# Patient Record
Sex: FEMALE | Race: WHITE | NOT HISPANIC OR LATINO | URBAN - METROPOLITAN AREA
[De-identification: names, ages, dates, MRNs, and addresses within clinical notes are randomized per-mention and may not be internally consistent; named-entity substitution may affect disease eponyms.]

---

## 2017-06-08 ENCOUNTER — OUTPATIENT (OUTPATIENT)
Dept: OUTPATIENT SERVICES | Facility: HOSPITAL | Age: 32
LOS: 1 days | End: 2017-06-08
Payer: COMMERCIAL

## 2017-06-08 DIAGNOSIS — O26.899 OTHER SPECIFIED PREGNANCY RELATED CONDITIONS, UNSPECIFIED TRIMESTER: ICD-10-CM

## 2017-06-08 DIAGNOSIS — Z3A.00 WEEKS OF GESTATION OF PREGNANCY NOT SPECIFIED: ICD-10-CM

## 2017-06-09 DIAGNOSIS — O09.613 SUPERVISION OF YOUNG PRIMIGRAVIDA, THIRD TRIMESTER: ICD-10-CM

## 2017-06-09 PROCEDURE — 99214 OFFICE O/P EST MOD 30 MIN: CPT

## 2017-06-09 PROCEDURE — 76818 FETAL BIOPHYS PROFILE W/NST: CPT

## 2017-06-10 ENCOUNTER — OUTPATIENT (OUTPATIENT)
Dept: OUTPATIENT SERVICES | Facility: HOSPITAL | Age: 32
LOS: 1 days | End: 2017-06-10
Payer: COMMERCIAL

## 2017-06-10 DIAGNOSIS — O26.899 OTHER SPECIFIED PREGNANCY RELATED CONDITIONS, UNSPECIFIED TRIMESTER: ICD-10-CM

## 2017-06-10 DIAGNOSIS — Z3A.00 WEEKS OF GESTATION OF PREGNANCY NOT SPECIFIED: ICD-10-CM

## 2017-06-10 PROCEDURE — 76818 FETAL BIOPHYS PROFILE W/NST: CPT

## 2017-06-10 PROCEDURE — 99214 OFFICE O/P EST MOD 30 MIN: CPT

## 2017-06-11 ENCOUNTER — OUTPATIENT (OUTPATIENT)
Dept: OUTPATIENT SERVICES | Facility: HOSPITAL | Age: 32
LOS: 1 days | End: 2017-06-11
Payer: COMMERCIAL

## 2017-06-11 DIAGNOSIS — O26.899 OTHER SPECIFIED PREGNANCY RELATED CONDITIONS, UNSPECIFIED TRIMESTER: ICD-10-CM

## 2017-06-11 DIAGNOSIS — Z3A.00 WEEKS OF GESTATION OF PREGNANCY NOT SPECIFIED: ICD-10-CM

## 2017-06-11 PROCEDURE — 99214 OFFICE O/P EST MOD 30 MIN: CPT

## 2017-06-15 ENCOUNTER — INPATIENT (INPATIENT)
Facility: HOSPITAL | Age: 32
LOS: 3 days | Discharge: HOME | End: 2017-06-19
Attending: OBSTETRICS & GYNECOLOGY | Admitting: OBSTETRICS & GYNECOLOGY
Payer: COMMERCIAL

## 2017-06-15 VITALS — HEIGHT: 64 IN | WEIGHT: 160.06 LBS

## 2017-06-15 LAB
ALBUMIN SERPL ELPH-MCNC: 3.4 G/DL — SIGNIFICANT CHANGE UP (ref 3.3–5)
ALP SERPL-CCNC: 59 U/L — SIGNIFICANT CHANGE UP (ref 40–120)
ALT FLD-CCNC: 12 U/L — SIGNIFICANT CHANGE UP (ref 10–45)
ANION GAP SERPL CALC-SCNC: 11 MMOL/L — SIGNIFICANT CHANGE UP (ref 5–17)
APPEARANCE UR: CLEAR — SIGNIFICANT CHANGE UP
APTT BLD: 29.1 SEC — SIGNIFICANT CHANGE UP (ref 27.5–37.4)
AST SERPL-CCNC: 12 U/L — SIGNIFICANT CHANGE UP (ref 10–40)
BASOPHILS NFR BLD AUTO: 0.2 % — SIGNIFICANT CHANGE UP (ref 0–2)
BILIRUB SERPL-MCNC: 0.2 MG/DL — SIGNIFICANT CHANGE UP (ref 0.2–1.2)
BILIRUB UR-MCNC: NEGATIVE — SIGNIFICANT CHANGE UP
BLD GP AB SCN SERPL QL: NEGATIVE — SIGNIFICANT CHANGE UP
BUN SERPL-MCNC: 11 MG/DL — SIGNIFICANT CHANGE UP (ref 7–23)
CALCIUM SERPL-MCNC: 9.1 MG/DL — SIGNIFICANT CHANGE UP (ref 8.4–10.5)
CHLORIDE SERPL-SCNC: 102 MMOL/L — SIGNIFICANT CHANGE UP (ref 96–108)
CO2 SERPL-SCNC: 24 MMOL/L — SIGNIFICANT CHANGE UP (ref 22–31)
COLOR SPEC: YELLOW — SIGNIFICANT CHANGE UP
CREAT SERPL-MCNC: 0.5 MG/DL — SIGNIFICANT CHANGE UP (ref 0.5–1.3)
DIFF PNL FLD: NEGATIVE — SIGNIFICANT CHANGE UP
EOSINOPHIL NFR BLD AUTO: 0.9 % — SIGNIFICANT CHANGE UP (ref 0–6)
FIBRINOGEN PPP-MCNC: 311 MG/DL — SIGNIFICANT CHANGE UP (ref 258–438)
GLUCOSE SERPL-MCNC: 85 MG/DL — SIGNIFICANT CHANGE UP (ref 70–99)
GLUCOSE UR QL: NEGATIVE — SIGNIFICANT CHANGE UP
HCT VFR BLD CALC: 34.1 % — LOW (ref 34.5–45)
HGB BLD-MCNC: 11.5 G/DL — SIGNIFICANT CHANGE UP (ref 11.5–15.5)
INR BLD: 1.18 — HIGH (ref 0.88–1.16)
KETONES UR-MCNC: NEGATIVE — SIGNIFICANT CHANGE UP
LDH SERPL L TO P-CCNC: 113 U/L — SIGNIFICANT CHANGE UP (ref 50–242)
LEUKOCYTE ESTERASE UR-ACNC: (no result)
LYMPHOCYTES # BLD AUTO: 26 % — SIGNIFICANT CHANGE UP (ref 13–44)
MCHC RBC-ENTMCNC: 27.4 PG — SIGNIFICANT CHANGE UP (ref 27–34)
MCHC RBC-ENTMCNC: 33.7 G/DL — SIGNIFICANT CHANGE UP (ref 32–36)
MCV RBC AUTO: 81.4 FL — SIGNIFICANT CHANGE UP (ref 80–100)
MONOCYTES NFR BLD AUTO: 8.2 % — SIGNIFICANT CHANGE UP (ref 2–14)
NEUTROPHILS NFR BLD AUTO: 64.7 % — SIGNIFICANT CHANGE UP (ref 43–77)
NITRITE UR-MCNC: NEGATIVE — SIGNIFICANT CHANGE UP
PH UR: 6 — SIGNIFICANT CHANGE UP (ref 5–8)
PLATELET # BLD AUTO: 199 K/UL — SIGNIFICANT CHANGE UP (ref 150–400)
POTASSIUM SERPL-MCNC: 4.5 MMOL/L — SIGNIFICANT CHANGE UP (ref 3.5–5.3)
POTASSIUM SERPL-SCNC: 4.5 MMOL/L — SIGNIFICANT CHANGE UP (ref 3.5–5.3)
PROT SERPL-MCNC: 5.8 G/DL — LOW (ref 6–8.3)
PROT UR-MCNC: (no result) MG/DL
PROTHROM AB SERPL-ACNC: 13.1 SEC — HIGH (ref 9.8–12.7)
RBC # BLD: 4.19 M/UL — SIGNIFICANT CHANGE UP (ref 3.8–5.2)
RBC # FLD: 13 % — SIGNIFICANT CHANGE UP (ref 10.3–16.9)
RH IG SCN BLD-IMP: POSITIVE — SIGNIFICANT CHANGE UP
SODIUM SERPL-SCNC: 137 MMOL/L — SIGNIFICANT CHANGE UP (ref 135–145)
SP GR SPEC: 1.02 — SIGNIFICANT CHANGE UP (ref 1–1.03)
URATE SERPL-MCNC: 3.7 MG/DL — SIGNIFICANT CHANGE UP (ref 2.5–7)
UROBILINOGEN FLD QL: 0.2 E.U./DL — SIGNIFICANT CHANGE UP
WBC # BLD: 5.6 K/UL — SIGNIFICANT CHANGE UP (ref 3.8–10.5)
WBC # FLD AUTO: 5.6 K/UL — SIGNIFICANT CHANGE UP (ref 3.8–10.5)

## 2017-06-15 RX ORDER — OXYTOCIN 10 UNIT/ML
333.33 VIAL (ML) INJECTION
Qty: 20 | Refills: 0 | Status: DISCONTINUED | OUTPATIENT
Start: 2017-06-15 | End: 2017-06-16

## 2017-06-15 RX ORDER — CITRIC ACID/SODIUM CITRATE 300-500 MG
15 SOLUTION, ORAL ORAL EVERY 4 HOURS
Qty: 0 | Refills: 0 | Status: DISCONTINUED | OUTPATIENT
Start: 2017-06-15 | End: 2017-06-16

## 2017-06-15 RX ORDER — SODIUM CHLORIDE 9 MG/ML
1000 INJECTION, SOLUTION INTRAVENOUS
Qty: 0 | Refills: 0 | Status: DISCONTINUED | OUTPATIENT
Start: 2017-06-15 | End: 2017-06-16

## 2017-06-15 RX ORDER — SODIUM CHLORIDE 9 MG/ML
1000 INJECTION, SOLUTION INTRAVENOUS ONCE
Qty: 0 | Refills: 0 | Status: COMPLETED | OUTPATIENT
Start: 2017-06-15 | End: 2017-06-16

## 2017-06-15 RX ORDER — DINOPROSTONE 10 MG/241MG
10 INSERT VAGINAL ONCE
Qty: 0 | Refills: 0 | Status: COMPLETED | OUTPATIENT
Start: 2017-06-15 | End: 2017-06-15

## 2017-06-15 RX ADMIN — DINOPROSTONE 10 MILLIGRAM(S): 10 INSERT VAGINAL at 11:19

## 2017-06-16 DIAGNOSIS — O09.613 SUPERVISION OF YOUNG PRIMIGRAVIDA, THIRD TRIMESTER: ICD-10-CM

## 2017-06-16 DIAGNOSIS — O09.623 SUPERVISION OF YOUNG MULTIGRAVIDA, THIRD TRIMESTER: ICD-10-CM

## 2017-06-16 LAB
ALBUMIN SERPL ELPH-MCNC: 2.5 G/DL — LOW (ref 3.3–5)
ALP SERPL-CCNC: 48 U/L — SIGNIFICANT CHANGE UP (ref 40–120)
ALT FLD-CCNC: 11 U/L — SIGNIFICANT CHANGE UP (ref 10–45)
ANION GAP SERPL CALC-SCNC: 12 MMOL/L — SIGNIFICANT CHANGE UP (ref 5–17)
APTT BLD: 30.5 SEC — SIGNIFICANT CHANGE UP (ref 27.5–37.4)
APTT BLD: 34.2 SEC — SIGNIFICANT CHANGE UP (ref 27.5–37.4)
AST SERPL-CCNC: 14 U/L — SIGNIFICANT CHANGE UP (ref 10–40)
BASE EXCESS BLDA CALC-SCNC: -5.6 MMOL/L — LOW (ref -2–3)
BILIRUB SERPL-MCNC: 0.4 MG/DL — SIGNIFICANT CHANGE UP (ref 0.2–1.2)
BUN SERPL-MCNC: 6 MG/DL — LOW (ref 7–23)
CA-I BLDA-SCNC: 1.01 MMOL/L — LOW (ref 1.12–1.3)
CALCIUM SERPL-MCNC: 7 MG/DL — LOW (ref 8.4–10.5)
CHLORIDE SERPL-SCNC: 107 MMOL/L — SIGNIFICANT CHANGE UP (ref 96–108)
CO2 SERPL-SCNC: 18 MMOL/L — LOW (ref 22–31)
COHGB MFR BLDA: 0.3 % — SIGNIFICANT CHANGE UP
CREAT SERPL-MCNC: 0.5 MG/DL — SIGNIFICANT CHANGE UP (ref 0.5–1.3)
D DIMER BLD IA.RAPID-MCNC: 694 NG/ML DDU — HIGH
EOSINOPHIL NFR BLD AUTO: 0.2 % — SIGNIFICANT CHANGE UP (ref 0–6)
EOSINOPHIL NFR BLD AUTO: 0.2 % — SIGNIFICANT CHANGE UP (ref 0–6)
FIBRINOGEN PPP-MCNC: 257 MG/DL — LOW (ref 258–438)
GAS PNL BLDA: SIGNIFICANT CHANGE UP
GLUCOSE SERPL-MCNC: 180 MG/DL — HIGH (ref 70–99)
HCO3 BLDA-SCNC: 19 MMOL/L — LOW (ref 21–28)
HCT VFR BLD CALC: 28.8 % — LOW (ref 34.5–45)
HCT VFR BLD CALC: 28.9 % — LOW (ref 34.5–45)
HGB BLD-MCNC: 9.4 G/DL — LOW (ref 11.5–15.5)
HGB BLD-MCNC: 9.4 G/DL — LOW (ref 11.5–15.5)
HGB BLDA-MCNC: 8.5 G/DL — LOW (ref 11.5–15.5)
INR BLD: 1.18 — HIGH (ref 0.88–1.16)
INR BLD: 1.26 — HIGH (ref 0.88–1.16)
LYMPHOCYTES # BLD AUTO: 16.2 % — SIGNIFICANT CHANGE UP (ref 13–44)
LYMPHOCYTES # BLD AUTO: 8.9 % — LOW (ref 13–44)
MCHC RBC-ENTMCNC: 27.1 PG — SIGNIFICANT CHANGE UP (ref 27–34)
MCHC RBC-ENTMCNC: 27.4 PG — SIGNIFICANT CHANGE UP (ref 27–34)
MCHC RBC-ENTMCNC: 32.5 G/DL — SIGNIFICANT CHANGE UP (ref 32–36)
MCHC RBC-ENTMCNC: 32.6 G/DL — SIGNIFICANT CHANGE UP (ref 32–36)
MCV RBC AUTO: 83.3 FL — SIGNIFICANT CHANGE UP (ref 80–100)
MCV RBC AUTO: 84 FL — SIGNIFICANT CHANGE UP (ref 80–100)
METHGB MFR BLDA: 0.2 % — SIGNIFICANT CHANGE UP
MONOCYTES NFR BLD AUTO: 4.3 % — SIGNIFICANT CHANGE UP (ref 2–14)
MONOCYTES NFR BLD AUTO: 4.8 % — SIGNIFICANT CHANGE UP (ref 2–14)
NEUTROPHILS NFR BLD AUTO: 78.8 % — HIGH (ref 43–77)
NEUTROPHILS NFR BLD AUTO: 86.6 % — HIGH (ref 43–77)
O2 CT VFR BLDA CALC: SIGNIFICANT CHANGE UP (ref 15–23)
OXYHGB MFR BLDA: 99 % — SIGNIFICANT CHANGE UP (ref 94–100)
PCO2 BLDA: 36 MMHG — SIGNIFICANT CHANGE UP (ref 32–45)
PH BLDA: 7.35 — SIGNIFICANT CHANGE UP (ref 7.35–7.45)
PLATELET # BLD AUTO: 160 K/UL — SIGNIFICANT CHANGE UP (ref 150–400)
PLATELET # BLD AUTO: 201 K/UL — SIGNIFICANT CHANGE UP (ref 150–400)
PO2 BLDA: 213 MMHG — HIGH (ref 83–108)
POTASSIUM BLDA-SCNC: 3.4 MMOL/L — LOW (ref 3.5–4.9)
POTASSIUM SERPL-MCNC: 3.8 MMOL/L — SIGNIFICANT CHANGE UP (ref 3.5–5.3)
POTASSIUM SERPL-SCNC: 3.8 MMOL/L — SIGNIFICANT CHANGE UP (ref 3.5–5.3)
PROT SERPL-MCNC: 4.2 G/DL — LOW (ref 6–8.3)
PROTHROM AB SERPL-ACNC: 13.1 SEC — HIGH (ref 9.8–12.7)
PROTHROM AB SERPL-ACNC: 14.1 SEC — HIGH (ref 9.8–12.7)
RBC # BLD: 3.43 M/UL — LOW (ref 3.8–5.2)
RBC # BLD: 3.47 M/UL — LOW (ref 3.8–5.2)
RBC # FLD: 12.7 % — SIGNIFICANT CHANGE UP (ref 10.3–16.9)
RBC # FLD: 12.8 % — SIGNIFICANT CHANGE UP (ref 10.3–16.9)
SAO2 % BLDA: 99 % — SIGNIFICANT CHANGE UP (ref 95–100)
SODIUM BLDA-SCNC: 128 MMOL/L — LOW (ref 138–146)
SODIUM SERPL-SCNC: 137 MMOL/L — SIGNIFICANT CHANGE UP (ref 135–145)
T PALLIDUM AB TITR SER: NEGATIVE — SIGNIFICANT CHANGE UP
WBC # BLD: 12 K/UL — HIGH (ref 3.8–10.5)
WBC # BLD: 9.4 K/UL — SIGNIFICANT CHANGE UP (ref 3.8–10.5)
WBC # FLD AUTO: 12 K/UL — HIGH (ref 3.8–10.5)
WBC # FLD AUTO: 9.4 K/UL — SIGNIFICANT CHANGE UP (ref 3.8–10.5)

## 2017-06-16 RX ORDER — FERROUS SULFATE 325(65) MG
325 TABLET ORAL DAILY
Qty: 0 | Refills: 0 | Status: DISCONTINUED | OUTPATIENT
Start: 2017-06-17 | End: 2017-06-19

## 2017-06-16 RX ORDER — DOCUSATE SODIUM 100 MG
100 CAPSULE ORAL
Qty: 0 | Refills: 0 | Status: DISCONTINUED | OUTPATIENT
Start: 2017-06-17 | End: 2017-06-19

## 2017-06-16 RX ORDER — OXYTOCIN 10 UNIT/ML
41.67 VIAL (ML) INJECTION
Qty: 20 | Refills: 0 | Status: DISCONTINUED | OUTPATIENT
Start: 2017-06-17 | End: 2017-06-19

## 2017-06-16 RX ORDER — NALOXONE HYDROCHLORIDE 4 MG/.1ML
0.1 SPRAY NASAL
Qty: 0 | Refills: 0 | Status: DISCONTINUED | OUTPATIENT
Start: 2017-06-16 | End: 2017-06-19

## 2017-06-16 RX ORDER — OXYCODONE HYDROCHLORIDE 5 MG/1
5 TABLET ORAL
Qty: 0 | Refills: 0 | Status: DISCONTINUED | OUTPATIENT
Start: 2017-06-16 | End: 2017-06-19

## 2017-06-16 RX ORDER — DIPHENHYDRAMINE HCL 50 MG
25 CAPSULE ORAL EVERY 6 HOURS
Qty: 0 | Refills: 0 | Status: DISCONTINUED | OUTPATIENT
Start: 2017-06-17 | End: 2017-06-19

## 2017-06-16 RX ORDER — OXYCODONE HYDROCHLORIDE 5 MG/1
10 TABLET ORAL
Qty: 0 | Refills: 0 | Status: DISCONTINUED | OUTPATIENT
Start: 2017-06-16 | End: 2017-06-19

## 2017-06-16 RX ORDER — IBUPROFEN 200 MG
600 TABLET ORAL EVERY 6 HOURS
Qty: 0 | Refills: 0 | Status: DISCONTINUED | OUTPATIENT
Start: 2017-06-17 | End: 2017-06-19

## 2017-06-16 RX ORDER — GLYCERIN ADULT
1 SUPPOSITORY, RECTAL RECTAL AT BEDTIME
Qty: 0 | Refills: 0 | Status: DISCONTINUED | OUTPATIENT
Start: 2017-06-17 | End: 2017-06-19

## 2017-06-16 RX ORDER — ONDANSETRON 8 MG/1
4 TABLET, FILM COATED ORAL EVERY 6 HOURS
Qty: 0 | Refills: 0 | Status: DISCONTINUED | OUTPATIENT
Start: 2017-06-16 | End: 2017-06-19

## 2017-06-16 RX ORDER — SIMETHICONE 80 MG/1
80 TABLET, CHEWABLE ORAL EVERY 4 HOURS
Qty: 0 | Refills: 0 | Status: DISCONTINUED | OUTPATIENT
Start: 2017-06-17 | End: 2017-06-19

## 2017-06-16 RX ORDER — ACETAMINOPHEN 500 MG
650 TABLET ORAL EVERY 6 HOURS
Qty: 0 | Refills: 0 | Status: DISCONTINUED | OUTPATIENT
Start: 2017-06-17 | End: 2017-06-19

## 2017-06-16 RX ORDER — OXYTOCIN 10 UNIT/ML
41.67 VIAL (ML) INJECTION
Qty: 20 | Refills: 0 | Status: DISCONTINUED | OUTPATIENT
Start: 2017-06-16 | End: 2017-06-19

## 2017-06-16 RX ORDER — HYDROMORPHONE HYDROCHLORIDE 2 MG/ML
0.5 INJECTION INTRAMUSCULAR; INTRAVENOUS; SUBCUTANEOUS
Qty: 0 | Refills: 0 | Status: DISCONTINUED | OUTPATIENT
Start: 2017-06-16 | End: 2017-06-19

## 2017-06-16 RX ORDER — OXYTOCIN 10 UNIT/ML
333.33 VIAL (ML) INJECTION
Qty: 20 | Refills: 0 | Status: DISCONTINUED | OUTPATIENT
Start: 2017-06-16 | End: 2017-06-19

## 2017-06-16 RX ORDER — SODIUM CHLORIDE 9 MG/ML
1000 INJECTION, SOLUTION INTRAVENOUS
Qty: 0 | Refills: 0 | Status: DISCONTINUED | OUTPATIENT
Start: 2017-06-17 | End: 2017-06-19

## 2017-06-16 RX ORDER — SODIUM CHLORIDE 9 MG/ML
1000 INJECTION, SOLUTION INTRAVENOUS
Qty: 0 | Refills: 0 | Status: DISCONTINUED | OUTPATIENT
Start: 2017-06-16 | End: 2017-06-17

## 2017-06-16 RX ORDER — ACETAMINOPHEN 500 MG
1000 TABLET ORAL ONCE
Qty: 0 | Refills: 0 | Status: COMPLETED | OUTPATIENT
Start: 2017-06-16 | End: 2017-06-16

## 2017-06-16 RX ORDER — LANOLIN
1 OINTMENT (GRAM) TOPICAL
Qty: 0 | Refills: 0 | Status: DISCONTINUED | OUTPATIENT
Start: 2017-06-17 | End: 2017-06-19

## 2017-06-16 RX ORDER — TETANUS TOXOID, REDUCED DIPHTHERIA TOXOID AND ACELLULAR PERTUSSIS VACCINE, ADSORBED 5; 2.5; 8; 8; 2.5 [IU]/.5ML; [IU]/.5ML; UG/.5ML; UG/.5ML; UG/.5ML
0.5 SUSPENSION INTRAMUSCULAR ONCE
Qty: 0 | Refills: 0 | Status: DISCONTINUED | OUTPATIENT
Start: 2017-06-17 | End: 2017-06-19

## 2017-06-16 RX ORDER — GENTAMICIN SULFATE 40 MG/ML
250 VIAL (ML) INJECTION ONCE
Qty: 0 | Refills: 0 | Status: DISCONTINUED | OUTPATIENT
Start: 2017-06-16 | End: 2017-06-16

## 2017-06-16 RX ORDER — OXYTOCIN 10 UNIT/ML
1 VIAL (ML) INJECTION
Qty: 30 | Refills: 0 | Status: DISCONTINUED | OUTPATIENT
Start: 2017-06-16 | End: 2017-06-16

## 2017-06-16 RX ADMIN — Medication 1 MILLIUNIT(S)/MIN: at 03:44

## 2017-06-16 RX ADMIN — SODIUM CHLORIDE 125 MILLILITER(S): 9 INJECTION, SOLUTION INTRAVENOUS at 03:44

## 2017-06-16 RX ADMIN — SODIUM CHLORIDE 2000 MILLILITER(S): 9 INJECTION, SOLUTION INTRAVENOUS at 07:35

## 2017-06-16 RX ADMIN — Medication 400 MILLIGRAM(S): at 23:32

## 2017-06-17 LAB
HCT VFR BLD CALC: 28.5 % — LOW (ref 34.5–45)
HCT VFR BLD CALC: 32.3 % — LOW (ref 34.5–45)
HGB BLD-MCNC: 10.9 G/DL — LOW (ref 11.5–15.5)
HGB BLD-MCNC: 9.4 G/DL — LOW (ref 11.5–15.5)
LYMPHOCYTES # BLD AUTO: 4.9 % — LOW (ref 13–44)
MCHC RBC-ENTMCNC: 27.8 PG — SIGNIFICANT CHANGE UP (ref 27–34)
MCHC RBC-ENTMCNC: 28 PG — SIGNIFICANT CHANGE UP (ref 27–34)
MCHC RBC-ENTMCNC: 33 G/DL — SIGNIFICANT CHANGE UP (ref 32–36)
MCHC RBC-ENTMCNC: 33.7 G/DL — SIGNIFICANT CHANGE UP (ref 32–36)
MCV RBC AUTO: 83 FL — SIGNIFICANT CHANGE UP (ref 80–100)
MCV RBC AUTO: 84.3 FL — SIGNIFICANT CHANGE UP (ref 80–100)
MONOCYTES NFR BLD AUTO: 5.3 % — SIGNIFICANT CHANGE UP (ref 2–14)
NEUTROPHILS NFR BLD AUTO: 89.8 % — HIGH (ref 43–77)
PLATELET # BLD AUTO: 175 K/UL — SIGNIFICANT CHANGE UP (ref 150–400)
PLATELET # BLD AUTO: 188 K/UL — SIGNIFICANT CHANGE UP (ref 150–400)
RBC # BLD: 3.38 M/UL — LOW (ref 3.8–5.2)
RBC # BLD: 3.89 M/UL — SIGNIFICANT CHANGE UP (ref 3.8–5.2)
RBC # FLD: 12.8 % — SIGNIFICANT CHANGE UP (ref 10.3–16.9)
RBC # FLD: 13.2 % — SIGNIFICANT CHANGE UP (ref 10.3–16.9)
WBC # BLD: 13.2 K/UL — HIGH (ref 3.8–10.5)
WBC # BLD: 9.3 K/UL — SIGNIFICANT CHANGE UP (ref 3.8–10.5)
WBC # FLD AUTO: 13.2 K/UL — HIGH (ref 3.8–10.5)
WBC # FLD AUTO: 9.3 K/UL — SIGNIFICANT CHANGE UP (ref 3.8–10.5)

## 2017-06-17 RX ORDER — LORATADINE 10 MG/1
10 TABLET ORAL DAILY
Qty: 0 | Refills: 0 | Status: DISCONTINUED | OUTPATIENT
Start: 2017-06-17 | End: 2017-06-19

## 2017-06-17 RX ADMIN — Medication 1 TABLET(S): at 11:30

## 2017-06-17 RX ADMIN — OXYCODONE HYDROCHLORIDE 5 MILLIGRAM(S): 5 TABLET ORAL at 23:28

## 2017-06-17 RX ADMIN — Medication 600 MILLIGRAM(S): at 07:20

## 2017-06-17 RX ADMIN — Medication 325 MILLIGRAM(S): at 11:30

## 2017-06-17 RX ADMIN — Medication 600 MILLIGRAM(S): at 12:20

## 2017-06-17 RX ADMIN — Medication 100 MILLIGRAM(S): at 11:31

## 2017-06-17 RX ADMIN — Medication 1 APPLICATION(S): at 11:36

## 2017-06-17 RX ADMIN — Medication 600 MILLIGRAM(S): at 06:48

## 2017-06-17 RX ADMIN — Medication 600 MILLIGRAM(S): at 18:27

## 2017-06-17 RX ADMIN — Medication 600 MILLIGRAM(S): at 11:31

## 2017-06-17 RX ADMIN — Medication 600 MILLIGRAM(S): at 19:01

## 2017-06-17 NOTE — PROGRESS NOTE ADULT - SUBJECTIVE AND OBJECTIVE BOX
Patient is doing better mentally. Bonded with baby. Producing some colostrum.   Clinical status is also stable. No heavy bleeding. VSS. Pain is controlled. Ate some food. Catheter was removed.     Baby is in stable condition. Neurologist will see the baby tonight.

## 2017-06-17 NOTE — PROGRESS NOTE ADULT - SUBJECTIVE AND OBJECTIVE BOX
Patient evaluated at bedside. She has figueroa catheter in and has been resting in bed overnight since procedure.  She reports pain is well controlled.  She denies headache, dizziness, chest pain, palpitations, shortness of breathe, nausea, vomiting or heavy vaginal bleeding.    Physical Exam:  Vital Signs Last 24 Hrs  T(C): 36.4, Max: 36.4 (06-17 @ 02:00)  T(F): 97.6, Max: 97.6 (06-17 @ 02:00)  HR: 71 (71 - 99)  BP: 100/67 (100/67 - 145/89)  BP(mean): --  RR: 18 (16 - 18)  SpO2: 97% (9% - 100%)    Constitutional: Alert & Oriented x3, No acute distress, cooperative   Gastrointestinal: soft, mildy tender, positive bowel sounds, no rebound or guarding; uterus firm at midline, three fb below umbilicus  Incision: steristrips in place; area clean, dry and intact; no erythema or induration   : s/p figueroa, lochia WNL   Extremities: SCDs in place, no calf tenderness or swelling                          10.9   13.2  )-----------( 188      ( 17 Jun 2017 00:31 )             32.3     06-16    137  |  107  |  6<L>  ----------------------------<  180<H>  3.8   |  18<L>  |  0.50    Ca    7.0<L>      16 Jun 2017 20:01    TPro  4.2<L>  /  Alb  2.5<L>  /  TBili  0.4  /  DBili  x   /  AST  14  /  ALT  11  /  AlkPhos  48  06-16    PT/INR - ( 16 Jun 2017 20:01 )   PT: 14.1 sec;   INR: 1.26       PTT - ( 16 Jun 2017 20:01 )  PTT:30.5 sec    A/P: Pt doing very well POD1 s/p CS complicated by acute blood loss during procedure requiring two units pRBC.  Vitals and repeat H&H remain stable and pt is clinically stable.  - Clears till pm 6/17  - Bandage till pm 6/17  - d/c figueroa  - Encourage ambulation  - Encourage incentive spirometry  - SCDs + SQH    Welebir PGY3

## 2017-06-17 NOTE — PROGRESS NOTE ADULT - SUBJECTIVE AND OBJECTIVE BOX
Patient is doing physically/clinically well. VSS are stable. Pain is controlled. Urine output is good. Had Esure for breakfast. No nausea. Abdomen is mildly distended, expected. dressing is clean and intact. No heavy bleeding. Helped her to go to NICU to see the baby. Patient is emotionally in some distress, crying, due to uncertainty of baby's diagnosis. I discussed with her what I know about the baby's condition: contracted joints, unclear diagnosis and prognosis. We do not have answers right. Tried to console the patient and encouraged to bond with baby. She has a very strong support from her  and family.

## 2017-06-18 RX ADMIN — Medication 600 MILLIGRAM(S): at 01:06

## 2017-06-18 RX ADMIN — Medication 650 MILLIGRAM(S): at 21:29

## 2017-06-18 RX ADMIN — Medication 300 MILLIGRAM(S): at 23:59

## 2017-06-18 RX ADMIN — Medication 25 MILLIGRAM(S): at 23:56

## 2017-06-18 RX ADMIN — OXYCODONE HYDROCHLORIDE 5 MILLIGRAM(S): 5 TABLET ORAL at 00:15

## 2017-06-18 RX ADMIN — Medication 600 MILLIGRAM(S): at 17:40

## 2017-06-18 RX ADMIN — Medication 325 MILLIGRAM(S): at 11:56

## 2017-06-18 RX ADMIN — Medication 100 MILLIGRAM(S): at 17:40

## 2017-06-18 RX ADMIN — Medication 600 MILLIGRAM(S): at 23:56

## 2017-06-18 RX ADMIN — Medication 600 MILLIGRAM(S): at 12:30

## 2017-06-18 RX ADMIN — Medication 600 MILLIGRAM(S): at 18:30

## 2017-06-18 RX ADMIN — Medication 600 MILLIGRAM(S): at 06:42

## 2017-06-18 RX ADMIN — Medication 1 TABLET(S): at 11:56

## 2017-06-18 RX ADMIN — Medication 600 MILLIGRAM(S): at 02:06

## 2017-06-18 RX ADMIN — Medication 600 MILLIGRAM(S): at 06:11

## 2017-06-18 RX ADMIN — Medication 600 MILLIGRAM(S): at 11:56

## 2017-06-18 RX ADMIN — Medication 100 MILLIGRAM(S): at 11:57

## 2017-06-18 NOTE — PROGRESS NOTE ADULT - SUBJECTIVE AND OBJECTIVE BOX
Patient is doing clinically well. VSS stable. Bleeding not heavy. Pain controlled. Incision has steri strips with dry blood, will replace after the shower.    Emotionally patient is sad, feels withdrawn from baby. I feel that her affect is appropriate.  is very supportive. Couple is planning to transfer baby to a more academic setting, [place] or [facility]. Already speaking to both places.    I encouraged patient to take things "day by day", and to try to stay strong regardless.

## 2017-06-18 NOTE — DISCHARGE NOTE OB - CARE PROVIDER_API CALL
Anita Bermudez (), Obstetrics and Gynecology  [address] [place] [address], [place] [address]  Phone: [phone]  Fax: [phone]

## 2017-06-18 NOTE — DISCHARGE NOTE OB - CARE PLAN
Principal Discharge DX:	 delivery delivered  Goal:	Recover fully  Instructions for follow-up, activity and diet:	See me in 7-10 days

## 2017-06-18 NOTE — PROGRESS NOTE ADULT - ASSESSMENT
A/P  yo 32y G 1 P 1  s/p c/s, POD # 2 ,c/w placenta abruption EBL 2500cc  1. Pain:OPM  2. GI:REG  3. : voiding  4. DVT prophylaxis:SCD  5. CBC- HB- 9.4, pt stable, continue f/u vital sign.

## 2017-06-18 NOTE — DISCHARGE NOTE OB - PATIENT PORTAL LINK FT
“You can access the [facility] Patient Portal, offered by [facility], by registering with the following website: [email or web address]”

## 2017-06-18 NOTE — PROGRESS NOTE ADULT - SUBJECTIVE AND OBJECTIVE BOX
Patient seen. She seems to be emotionally better. Baby is being transferred to [place] tomorrow.     Incision has slight redness, but looks more like an allergic reaction to adhesive. Steri strips replaced.     Repeat CBC in am.

## 2017-06-19 VITALS
OXYGEN SATURATION: 98 % | TEMPERATURE: 97 F | SYSTOLIC BLOOD PRESSURE: 111 MMHG | HEART RATE: 72 BPM | DIASTOLIC BLOOD PRESSURE: 73 MMHG | RESPIRATION RATE: 17 BRPM

## 2017-06-19 LAB
HCT VFR BLD CALC: 27.4 % — LOW (ref 34.5–45)
HGB BLD-MCNC: 9 G/DL — LOW (ref 11.5–15.5)
MCHC RBC-ENTMCNC: 27.3 PG — SIGNIFICANT CHANGE UP (ref 27–34)
MCHC RBC-ENTMCNC: 32.8 G/DL — SIGNIFICANT CHANGE UP (ref 32–36)
MCV RBC AUTO: 83 FL — SIGNIFICANT CHANGE UP (ref 80–100)
PLATELET # BLD AUTO: 152 K/UL — SIGNIFICANT CHANGE UP (ref 150–400)
RBC # BLD: 3.3 M/UL — LOW (ref 3.8–5.2)
RBC # FLD: 13.6 % — SIGNIFICANT CHANGE UP (ref 10.3–16.9)
SURGICAL PATHOLOGY STUDY: SIGNIFICANT CHANGE UP
WBC # BLD: 5.4 K/UL — SIGNIFICANT CHANGE UP (ref 3.8–10.5)
WBC # FLD AUTO: 5.4 K/UL — SIGNIFICANT CHANGE UP (ref 3.8–10.5)

## 2017-06-19 PROCEDURE — 85379 FIBRIN DEGRADATION QUANT: CPT

## 2017-06-19 PROCEDURE — 86900 BLOOD TYPING SEROLOGIC ABO: CPT

## 2017-06-19 PROCEDURE — 84295 ASSAY OF SERUM SODIUM: CPT

## 2017-06-19 PROCEDURE — 80053 COMPREHEN METABOLIC PANEL: CPT

## 2017-06-19 PROCEDURE — 86923 COMPATIBILITY TEST ELECTRIC: CPT

## 2017-06-19 PROCEDURE — 84132 ASSAY OF SERUM POTASSIUM: CPT

## 2017-06-19 PROCEDURE — 85027 COMPLETE CBC AUTOMATED: CPT

## 2017-06-19 PROCEDURE — 36415 COLL VENOUS BLD VENIPUNCTURE: CPT

## 2017-06-19 PROCEDURE — 85018 HEMOGLOBIN: CPT

## 2017-06-19 PROCEDURE — 86780 TREPONEMA PALLIDUM: CPT

## 2017-06-19 PROCEDURE — 86850 RBC ANTIBODY SCREEN: CPT

## 2017-06-19 PROCEDURE — 84550 ASSAY OF BLOOD/URIC ACID: CPT

## 2017-06-19 PROCEDURE — 85025 COMPLETE CBC W/AUTO DIFF WBC: CPT

## 2017-06-19 PROCEDURE — P9016: CPT

## 2017-06-19 PROCEDURE — 85610 PROTHROMBIN TIME: CPT

## 2017-06-19 PROCEDURE — 88307 TISSUE EXAM BY PATHOLOGIST: CPT

## 2017-06-19 PROCEDURE — 85384 FIBRINOGEN ACTIVITY: CPT

## 2017-06-19 PROCEDURE — 82330 ASSAY OF CALCIUM: CPT

## 2017-06-19 PROCEDURE — 85730 THROMBOPLASTIN TIME PARTIAL: CPT

## 2017-06-19 PROCEDURE — 86901 BLOOD TYPING SEROLOGIC RH(D): CPT

## 2017-06-19 PROCEDURE — 81001 URINALYSIS AUTO W/SCOPE: CPT

## 2017-06-19 PROCEDURE — 36430 TRANSFUSION BLD/BLD COMPNT: CPT

## 2017-06-19 PROCEDURE — 83615 LACTATE (LD) (LDH) ENZYME: CPT

## 2017-06-19 RX ORDER — BACITRACIN ZINC NEOMYCIN SULFATE POLYMYXIN B SULFATE 400; 3.5; 5 [IU]/G; MG/G; [IU]/G
1 OINTMENT TOPICAL EVERY 6 HOURS
Qty: 0 | Refills: 0 | Status: DISCONTINUED | OUTPATIENT
Start: 2017-06-19 | End: 2017-06-19

## 2017-06-19 RX ADMIN — Medication 300 MILLIGRAM(S): at 18:03

## 2017-06-19 RX ADMIN — Medication 600 MILLIGRAM(S): at 18:04

## 2017-06-19 RX ADMIN — Medication 600 MILLIGRAM(S): at 13:15

## 2017-06-19 RX ADMIN — Medication 100 MILLIGRAM(S): at 14:02

## 2017-06-19 RX ADMIN — Medication 600 MILLIGRAM(S): at 06:11

## 2017-06-19 RX ADMIN — Medication 600 MILLIGRAM(S): at 12:46

## 2017-06-19 RX ADMIN — BACITRACIN ZINC NEOMYCIN SULFATE POLYMYXIN B SULFATE 1 APPLICATION(S): 400; 3.5; 5 OINTMENT TOPICAL at 18:06

## 2017-06-19 RX ADMIN — Medication 650 MILLIGRAM(S): at 14:02

## 2017-06-19 RX ADMIN — BACITRACIN ZINC NEOMYCIN SULFATE POLYMYXIN B SULFATE 1 APPLICATION(S): 400; 3.5; 5 OINTMENT TOPICAL at 10:20

## 2017-06-19 RX ADMIN — BACITRACIN ZINC NEOMYCIN SULFATE POLYMYXIN B SULFATE 1 APPLICATION(S): 400; 3.5; 5 OINTMENT TOPICAL at 12:50

## 2017-06-19 RX ADMIN — Medication 600 MILLIGRAM(S): at 07:10

## 2017-06-19 RX ADMIN — Medication 600 MILLIGRAM(S): at 00:56

## 2017-06-19 RX ADMIN — Medication 300 MILLIGRAM(S): at 12:48

## 2017-06-19 RX ADMIN — Medication 325 MILLIGRAM(S): at 12:54

## 2017-06-19 NOTE — PROGRESS NOTE ADULT - SUBJECTIVE AND OBJECTIVE BOX
Patient evaluated at bedside. She has been ambulating without assistance, voiding spontaneously, passing gas, tolerating regular diet and is breastfeeding. She reports pain is well controlled. She denies headache, dizziness, chest pain, palpitations, shortness of breathe, nausea, vomiting or heavy vaginal bleeding.    Physical Exam:  Vital Signs Last 24 Hrs  T(C): 36.5, Max: 36.7 (06-18 @ 12:00)  T(F): 97.7, Max: 98.1 (06-18 @ 18:00)  HR: 79 (79 - 93)  BP: 91/60 (91/60 - 126/85)  BP(mean): --  RR: 16 (16 - 18)  SpO2: 98% (98% - 98%)    Constitutional: Alert & Oriented x3, No acute distress, cooperative   Gastrointestinal: soft, mildy tender, positive bowel sounds, no rebound or guarding; uterus firm at midline, three fb below umbilicus  Incision: steristrips in place; area clean, dry and intact; no erythema or induration   : s/p figueroa, lochia WNL   Extremities: no calf tenderness or swelling                          9.4    9.3   )-----------( 175      ( 17 Jun 2017 18:36 )             28.5     A/P: POD3 s/p stat CS for placental abruption.  Pt is doing very well clinically, but very worried about the future of her baby who is being transferred to [place] for further evaluation and management.  - Regular diet  - OOB  - OPM  - SCD + SQH  - Incentive spirometry    Welebir PGY3

## 2017-06-19 NOTE — PROGRESS NOTE ADULT - SUBJECTIVE AND OBJECTIVE BOX
Patient appears clinically stable. Saw her smile. Baby is transferred to [place].     Skin around incision looks erythematous, but more consistent with allergic reaction. Will apply neosporin.     CBC at this time.     Patient is being discharged today, when baby is transferred.

## 2017-06-21 DIAGNOSIS — O40.3XX0 POLYHYDRAMNIOS, THIRD TRIMESTER, NOT APPLICABLE OR UNSPECIFIED: ICD-10-CM

## 2017-06-21 DIAGNOSIS — O45.8X3 OTHER PREMATURE SEPARATION OF PLACENTA, THIRD TRIMESTER: ICD-10-CM

## 2017-06-21 DIAGNOSIS — D62 ACUTE POSTHEMORRHAGIC ANEMIA: ICD-10-CM

## 2017-06-21 DIAGNOSIS — L23.1 ALLERGIC CONTACT DERMATITIS DUE TO ADHESIVES: ICD-10-CM

## 2017-06-21 DIAGNOSIS — O36.5930 MATERNAL CARE FOR OTHER KNOWN OR SUSPECTED POOR FETAL GROWTH, THIRD TRIMESTER, NOT APPLICABLE OR UNSPECIFIED: ICD-10-CM

## 2017-06-21 DIAGNOSIS — O45.93 PREMATURE SEPARATION OF PLACENTA, UNSPECIFIED, THIRD TRIMESTER: ICD-10-CM

## 2017-06-21 DIAGNOSIS — Z3A.37 37 WEEKS GESTATION OF PREGNANCY: ICD-10-CM

## 2017-06-26 DIAGNOSIS — O45.8X3 OTHER PREMATURE SEPARATION OF PLACENTA, THIRD TRIMESTER: ICD-10-CM

## 2018-04-06 NOTE — PROGRESS NOTE ADULT - SUBJECTIVE AND OBJECTIVE BOX
Patient evaluated at bedside.   She reports pain is well controlled with  She denies headache, dizziness, chest pain, palpitations, shortness of breathe, nausea, vomiting or heavy vaginal bleeding.  She has been ambulating without assistance, voiding spontaneously, passing gas, tolerating regular diet and is breastfeeding.    Physical Exam:  Vital Signs Last 24 Hrs  T(C): 36.7, Max: 36.8 (06-17 @ 06:10)  T(F): 98, Max: 98.3 (06-17 @ 06:10)  HR: 83 (68 - 88)  BP: 113/79 (91/66 - 138/86)  BP(mean): --  RR: 16 (16 - 18)  SpO2: 97% (97% - 100%)    GA: NAD, A+0 x 3  CV: RRR  Pulm: CTAB  Breasts: soft, nontender, no palpable masses  Abd: + BS, soft, nontender, nondistended, no rebound or guarding, incision clean, dry and intact, uterus firm at midline,  fb below umbilicus  Extremities: no swelling or calf tenderness, reflexes +2 bilaterally                            9.4    9.3   )-----------( 175      ( 17 Jun 2017 18:36 )             28.5     06-16    137  |  107  |  6<L>  ----------------------------<  180<H>  3.8   |  18<L>  |  0.50    Ca    7.0<L>      16 Jun 2017 20:01    TPro  4.2<L>  /  Alb  2.5<L>  /  TBili  0.4  /  DBili  x   /  AST  14  /  ALT  11  /  AlkPhos  48  06-16      PT/INR - ( 16 Jun 2017 20:01 )   PT: 14.1 sec;   INR: 1.26          PTT - ( 16 Jun 2017 20:01 )  PTT:30.5 sec Biopsy Type: H and E

## 2020-10-20 NOTE — PATIENT PROFILE OB - BRADEN SCORE (IF 18 OR LESS ACTIVATE SKIN INJURY RISK INCREASED GUIDELINE), MLM
Received refill request for MiraLax.    New Medications Ordered This Visit   Medications   • polyethylene glycol (MiraLax) 17 GM/SCOOP powder     Sig: Take 17 g by mouth Daily. Dissolve in large glass of water and take daily     Dispense:  507 g     Refill:  4      21